# Patient Record
Sex: MALE | Race: WHITE | URBAN - METROPOLITAN AREA
[De-identification: names, ages, dates, MRNs, and addresses within clinical notes are randomized per-mention and may not be internally consistent; named-entity substitution may affect disease eponyms.]

---

## 2017-01-19 PROBLEM — N40.0 BPH (BENIGN PROSTATIC HYPERPLASIA): Status: ACTIVE | Noted: 2017-01-19

## 2017-01-23 PROBLEM — R39.15 URGENCY OF URINATION: Status: ACTIVE | Noted: 2017-01-23

## 2017-01-23 PROBLEM — R35.1 NOCTURIA: Status: ACTIVE | Noted: 2017-01-23

## 2017-01-23 PROBLEM — R33.9 INCOMPLETE BLADDER EMPTYING: Status: ACTIVE | Noted: 2017-01-23

## 2017-04-13 PROBLEM — R39.12 WEAK URINARY STREAM: Status: ACTIVE | Noted: 2017-04-13

## 2020-08-08 PROBLEM — Z80.42 FAMILY HISTORY OF PROSTATE CANCER: Status: ACTIVE | Noted: 2020-08-08

## 2021-03-05 ENCOUNTER — IMMUNIZATION (OUTPATIENT)
Dept: PRIMARY CARE CLINIC | Age: 71
End: 2021-03-05
Payer: COMMERCIAL

## 2021-03-05 PROCEDURE — 0001A COVID-19, PFIZER VACCINE 30MCG/0.3ML DOSE: CPT | Performed by: NURSE PRACTITIONER

## 2021-03-05 PROCEDURE — 91300 COVID-19, PFIZER VACCINE 30MCG/0.3ML DOSE: CPT | Performed by: NURSE PRACTITIONER

## 2021-03-31 ENCOUNTER — IMMUNIZATION (OUTPATIENT)
Dept: PRIMARY CARE CLINIC | Age: 71
End: 2021-03-31
Payer: COMMERCIAL

## 2021-03-31 PROCEDURE — 0002A COVID-19, PFIZER VACCINE 30MCG/0.3ML DOSE: CPT | Performed by: NURSE PRACTITIONER

## 2021-03-31 PROCEDURE — 91300 COVID-19, PFIZER VACCINE 30MCG/0.3ML DOSE: CPT | Performed by: NURSE PRACTITIONER

## 2023-10-30 PROBLEM — L82.1 OTHER SEBORRHEIC KERATOSIS: Status: ACTIVE | Noted: 2023-05-11

## 2023-10-30 PROBLEM — Z85.828 PERSONAL HISTORY OF OTHER MALIGNANT NEOPLASM OF SKIN: Status: ACTIVE | Noted: 2023-05-11

## 2023-10-30 PROBLEM — D48.5 NEOPLASM OF UNCERTAIN BEHAVIOR OF SKIN: Status: ACTIVE | Noted: 2023-05-11

## 2023-10-30 PROBLEM — L91.8 OTHER HYPERTROPHIC DISORDERS OF THE SKIN: Status: ACTIVE | Noted: 2023-05-11

## 2023-10-30 PROBLEM — L60.1 ONYCHOLYSIS: Status: ACTIVE | Noted: 2023-05-11

## 2023-10-30 PROBLEM — D18.01 HEMANGIOMA OF SKIN AND SUBCUTANEOUS TISSUE: Status: ACTIVE | Noted: 2023-05-11

## 2023-10-30 PROBLEM — L57.0 ACTINIC KERATOSIS: Status: ACTIVE | Noted: 2023-05-11

## 2023-10-30 PROBLEM — L81.4 OTHER MELANIN HYPERPIGMENTATION: Status: ACTIVE | Noted: 2023-05-11

## 2023-10-30 PROBLEM — L90.5 SCAR CONDITION AND FIBROSIS OF SKIN: Status: ACTIVE | Noted: 2023-05-11

## 2023-10-30 PROBLEM — L30.8 OTHER SPECIFIED DERMATITIS: Status: ACTIVE | Noted: 2023-05-11

## 2023-10-30 PROBLEM — I83.10 VARICOSE VEINS OF UNSPECIFIED LOWER EXTREMITY WITH INFLAMMATION: Status: ACTIVE | Noted: 2023-05-11

## 2023-10-30 PROBLEM — B35.3 TINEA PEDIS: Status: ACTIVE | Noted: 2023-05-11

## 2023-11-02 ENCOUNTER — OFFICE VISIT (OUTPATIENT)
Dept: DERMATOLOGY | Facility: CLINIC | Age: 73
End: 2023-11-02
Payer: MEDICARE

## 2023-11-02 DIAGNOSIS — L82.1 SEBORRHEIC KERATOSIS: ICD-10-CM

## 2023-11-02 DIAGNOSIS — L81.4 LENTIGO: ICD-10-CM

## 2023-11-02 DIAGNOSIS — D18.01 HEMANGIOMA OF SKIN: ICD-10-CM

## 2023-11-02 DIAGNOSIS — L91.8 SKIN TAG: ICD-10-CM

## 2023-11-02 DIAGNOSIS — Z12.83 ENCOUNTER FOR SCREENING FOR MALIGNANT NEOPLASM OF SKIN: Primary | ICD-10-CM

## 2023-11-02 PROCEDURE — 99213 OFFICE O/P EST LOW 20 MIN: CPT | Performed by: NURSE PRACTITIONER

## 2023-11-02 PROCEDURE — 1159F MED LIST DOCD IN RCRD: CPT | Performed by: NURSE PRACTITIONER

## 2023-11-02 NOTE — PROGRESS NOTES
Subjective     George Starr is a 73 y.o. male who presents for the following: Skin Check (Patient presents in office today for full body skin exam. /PMHX significant for NMSC./Patient endorses the following concerns since their last visit: none./Signs/symptoms - patient denies./Medication changes include: none./Patient denies further complaints. ).     Review of Systems:  No other skin or systemic complaints other than what is documented elsewhere in the note.    The following portions of the chart were reviewed this encounter and updated as appropriate:         Skin Cancer History  No skin cancer on file.      Specialty Problems          Dermatology Problems    Actinic keratosis    Hemangioma of skin and subcutaneous tissue    Neoplasm of uncertain behavior of skin    Onycholysis    Other hypertrophic disorders of the skin    Other melanin hyperpigmentation    Other seborrheic keratosis    Other specified dermatitis    Personal history of other malignant neoplasm of skin    Scar condition and fibrosis of skin    Tinea pedis        Objective   Well appearing patient in no apparent distress; mood and affect are within normal limits.    A full examination was performed including scalp, head, eyes, ears, nose, lips, neck, chest, axillae, abdomen, back, buttocks, bilateral upper extremities, bilateral lower extremities, hands, feet, fingers, toes, fingernails, and toenails. All findings within normal limits unless otherwise noted below.    Assessment/Plan   1. Encounter for screening for malignant neoplasm of skin  Scattered benign lesions     Scattered benign lesions       2. Hemangioma of skin  Violaceous/red papule with maroon lagoons     - A cherry hemangioma is a small macule (small, flat, smooth area) or papule (small, solid bump) formed from an overgrowth of tiny blood vessels in the skin. Cherry hemangiomas are characteristically red or purplish in color. They often first appear in middle adulthood and usually  increase in number with age. Cherry hemangiomas are noncancerous (benign) and are common in adults.  - Lesions are benign, reassured patient.     3. Skin tag  Fleshy, skin-colored sessile and pedunculated papules.     Acrochordon (skin tag)  - The benign nature of the lesion was discussed with patient.   - A skin tag (acrochordon) is a common, possibly inherited condition that manifests as small, flesh-colored growths on a thin stalk. Skin tags are benign lesions that can sometimes become irritated or traumatized.  - Skin tags are very common, and their incidence increases with age. Seen more often in people with growth hormone excess (acromegaly).   - Skin tags are most commonly found on the eyelids, neck, armpits, and groin area. They are flesh-colored growths on a thin stalk, ranging in size from small to large.        4. Seborrheic keratosis  Stuck on verrucous, tan-brown papules and plaques.      Although Seborrheic Keratoses can be troublesome and unsightly, they are entirely benign.  Removal of Seborrheic Keratoses is considered a cosmetic procedure. Removal is typically performed using liquid nitrogen cryotherapy.  Treatment of current lesions does not prevent the development of new Seborrheic Keratoses in the future.    5. Lentigo  Scattered tan macules in sun-exposed areas.    A solar lentigo (plural, solar lentigines), sometimes called an age spot or liver spot, is a brown macule (small, flat, smooth area of skin) caused by chronic sun or artificial ultraviolet (UV) light exposure. There may be just one lentigo or there may be multiple. This type of lentigo is different from lentigo simplex (discussed separately) because it is caused by exposure to UV light. Solar lentigines are benign, but they do indicate excessive sun exposure, a risk factor for the development of skin cancer.  Lesions are benign, no treatment needed.

## 2024-11-06 ENCOUNTER — APPOINTMENT (OUTPATIENT)
Dept: DERMATOLOGY | Facility: CLINIC | Age: 74
End: 2024-11-06
Payer: MEDICARE

## 2024-11-06 DIAGNOSIS — D18.01 HEMANGIOMA OF SKIN: ICD-10-CM

## 2024-11-06 DIAGNOSIS — L82.1 SEBORRHEIC KERATOSIS: ICD-10-CM

## 2024-11-06 DIAGNOSIS — Z12.83 ENCOUNTER FOR SCREENING FOR MALIGNANT NEOPLASM OF SKIN: ICD-10-CM

## 2024-11-06 DIAGNOSIS — L57.0 ACTINIC KERATOSIS: Primary | ICD-10-CM

## 2024-11-06 DIAGNOSIS — L81.4 LENTIGO: ICD-10-CM

## 2024-11-06 PROCEDURE — 1160F RVW MEDS BY RX/DR IN RCRD: CPT | Performed by: NURSE PRACTITIONER

## 2024-11-06 PROCEDURE — 99214 OFFICE O/P EST MOD 30 MIN: CPT | Performed by: NURSE PRACTITIONER

## 2024-11-06 PROCEDURE — G2211 COMPLEX E/M VISIT ADD ON: HCPCS | Performed by: NURSE PRACTITIONER

## 2024-11-06 PROCEDURE — 1159F MED LIST DOCD IN RCRD: CPT | Performed by: NURSE PRACTITIONER

## 2024-11-06 RX ORDER — ALFUZOSIN HYDROCHLORIDE 10 MG/1
1 TABLET, EXTENDED RELEASE ORAL
COMMUNITY
Start: 2024-09-21

## 2024-11-06 RX ORDER — FLUOROURACIL 50 MG/G
CREAM TOPICAL
Qty: 40 G | Refills: 0 | Status: SHIPPED | OUTPATIENT
Start: 2024-11-06

## 2024-11-07 NOTE — PROGRESS NOTES
Subjective     George Starr is a 74 y.o. male who presents for the following: Skin Check (Presents for FBSE. Personal hx BCC (2005), and AK's. Lesion of concern on nose. ).     Review of Systems:  No other skin or systemic complaints other than what is documented elsewhere in the note.    The following portions of the chart were reviewed this encounter and updated as appropriate:  Tobacco  Allergies  Meds  Problems  Med Hx  Surg Hx  Fam Hx       Skin Cancer History  No skin cancer on file.    Specialty Problems          Dermatology Problems    Actinic keratosis    Hemangioma of skin and subcutaneous tissue    Neoplasm of uncertain behavior of skin    Onycholysis    Other hypertrophic disorders of the skin    Other melanin hyperpigmentation    Other seborrheic keratosis    Other specified dermatitis    Personal history of other malignant neoplasm of skin    Scar condition and fibrosis of skin    Tinea pedis     Past Medical History:  George Starr  has a past medical history of Basal cell carcinoma (01/2005).    Past Surgical History:  George Starr  has no past surgical history on file.    Family History:  Patient family history is not on file.    Social History:  George Starr  has no history on file for tobacco use, alcohol use, and drug use.    Allergies:  Patient has no known allergies.    Current Medications / CAM's:    Current Outpatient Medications:     alfuzosin (Uroxatral) 10 mg 24 hr tablet, Take 1 tablet (10 mg) by mouth early in the morning.., Disp: , Rfl:     fluorouracil (Efudex) 5 % cream cream, Apply thin coat to areas of face twice daily for a total of 2 weeks (follow hand out). Wash hands thoroughly after each application., Disp: 40 g, Rfl: 0     Objective   Well appearing patient in no apparent distress; mood and affect are within normal limits.    A focused skin examination was performed. All findings within normal limits unless otherwise noted below.    Assessment/Plan   1. Actinic  keratosis  Head - Anterior (Face)  Scattered erythematous, scaly macules and patches    Actinic keratosis  - Actinic keratosis (AK) is a skin condition caused by sun damage. It causes scaly, rough, or bumpy spots on the skin.  - If left alone, AKs may turn into a skin cancer. People who burn easily or have trouble tanning are at more risk for developing AKs.   - There is no one test for AKs and diagnosis is made by clinical appearance. Treatment options include cryotherapy, therapy with lights, and various creams.     - Common places for AKs to occur are areas exposed to sun (face, arms, hands)          To lower the chance of getting AK, you can:       Stay out of the sun in the middle of the day (from 10 a.m. to 4 p.m.)       Wear sunscreen - An SPF of at least 30 is best. The SPF number is on the sunscreen bottle or tube.       Wear a wide-brimmed hat, long-sleeved shirt, long pants, or long skirt outside. A baseball hat does not give much protection.        Do not use tanning beds.         PLAN  - Due to the amount of AKs, I recommend field treatment with 5-FU. Hand out given.   - The patient expressed understanding, is in agreement with this plan, and wishes to proceed.  Fluorouracil (5-FU)       This medication is used to treat pre-skin cancer skin growths and works by blocking the growth of abnormal skin cells. You will use this drug twice daily for 2 weeks        To apply:       1. Wash and dry the area gently prior to administration of the cream.         2. Use a very small amount rubbed into the area with a fingertip.         3. Wash your hands after application.        4. Do not use on normal skin or eyes or lips.         Expect the area to get red and irritated.  Initially there will be mild to severe stinging or burning in the area.  The area may weep, crust or blister.  Watch for signs of infection, excessive wounding, ulcers, and scarring.  Marked itching maybe a sign of allergy to the drug, please  call the clinic immediately.         Avoid other medications or makeup in the treated area while using 5-fluorouracil and while any wound is healing. Avoid applying around the eye or eyelids. Avoid procedures in the area.  Avoid sun exposure to the area. Use sunscreen SPF 30 or greater while applying the drug. Do not use if pregnant or nursing.  Do not share this medicine with others and keep it away from children and pets.        Gentle moisturizers and Vaseline to soothe the area can be used several times per day.  The treated area will be more sun sensitive. If the lesion does not improve other treatment regimens may be necessary.  Complete healing may take up to 2 months.  After the treatment, you may notice redness in the area or skin color change in the area.   - Risks, benefits, side effects, alternatives and options were discussed with patient and the patient voiced understanding.       fluorouracil (Efudex) 5 % cream cream - Head - Anterior (Face)  Apply thin coat to areas of face twice daily for a total of 2 weeks (follow hand out). Wash hands thoroughly after each application.    2. Encounter for screening for malignant neoplasm of skin  Scattered benign lesions    - Protective measures, such as avoiding skin exposure to sunlight during peak sun hours (10 AM to 3 PM), wearing protective clothing, and applying high-SPF sunscreen, are essential for reducing exposure to harmful ultraviolet (UV) light.  - Monthly self-examination of the skin is helpful to detect new lesions or changes in existing lesions.  - Discussed signs and symptoms of sun-related skin cancers.   - Make sure your moles are not signs of skin cancer (melanoma). Remember the ABCDEs of melanoma lesions:  A - Asymmetry: One half of the lesion does not mirror the other half.  B - Border: The borders are irregular or vague (indistinct).  C - Color: More than one color may be noted within the mole.  D - Diameter: Size greater than 6 mm (roughly  the size of a pencil eraser) may be concerning.  E - Evolving: Notable changes in the lesion over time are suspicious signs for skin cancer.    3. Seborrheic keratosis  Stuck on verrucous, tan-brown papules and plaques.      Although Seborrheic Keratoses can be troublesome and unsightly, they are entirely benign.  Removal of Seborrheic Keratoses is considered a cosmetic procedure. Removal is typically performed using liquid nitrogen cryotherapy.  Treatment of current lesions does not prevent the development of new Seborrheic Keratoses in the future.    4. Hemangioma of skin  Violaceous/red papule with maroon lagoons     - A cherry hemangioma is a small macule (small, flat, smooth area) or papule (small, solid bump) formed from an overgrowth of tiny blood vessels in the skin. Cherry hemangiomas are characteristically red or purplish in color. They often first appear in middle adulthood and usually increase in number with age. Cherry hemangiomas are noncancerous (benign) and are common in adults.  - Lesions are benign, reassured patient.     5. Lentigo  Scattered tan macules in sun-exposed areas.    A solar lentigo (plural, solar lentigines), sometimes called an age spot or liver spot, is a brown macule (small, flat, smooth area of skin) caused by chronic sun or artificial ultraviolet (UV) light exposure. There may be just one lentigo or there may be multiple. This type of lentigo is different from lentigo simplex (discussed separately) because it is caused by exposure to UV light. Solar lentigines are benign, but they do indicate excessive sun exposure, a risk factor for the development of skin cancer.  Lesions are benign, no treatment needed.     Follow up in 6 months for a total body skin exam. Please call me if there are any changes or development of concerning symptoms (lesion/skin condition is changing, bleeding, enlarging, or worsening).

## 2024-11-15 ENCOUNTER — TELEPHONE (OUTPATIENT)
Dept: DERMATOLOGY | Facility: CLINIC | Age: 74
End: 2024-11-15
Payer: MEDICARE

## 2024-11-15 NOTE — TELEPHONE ENCOUNTER
Pt contacted office stating that he has not yet noticed any results from the fluorouracil cream that he started using on Tuesday and wants to know if he is using enough. Advised pt to continue using a thin layer to affected areas twice daily for the two weeks as it may take another day or two to start feeling the effects. Let pt know to contact office after 2 weeks of therapy is complete if he does not feel as if it was effective. Pt verbalized understanding and had no further questions at this time.    Stacie Shea RN

## 2024-11-21 ENCOUNTER — TELEPHONE (OUTPATIENT)
Dept: DERMATOLOGY | Facility: CLINIC | Age: 74
End: 2024-11-21
Payer: MEDICARE

## 2024-11-21 NOTE — TELEPHONE ENCOUNTER
Pt contacted office stating that he has completed the two weeks of 5-FU to his nose and wants to know if he should continue to use the cream for longer. Explained to pt that he needs to let the skin heal and call us if he notices the lesion has no resolved after the skin is no longer red and inflamed. Pt also said that he is unsure where else on his face he is to apply the 5-FU as there are not many noticeable spots and he doesn't want to apply it to a large surface area. Per Susan Mayne, pt can just hold off on continued treatment with the 5-FU since he has treated the large areas of concern and she will recheck his face at his next 6 month skin check. Advised pt to contact office sooner if he notices new and bothersome lesions that appear. Pt verbalized understanding and had no further questions at this time.      Stacie Shea RN

## 2025-05-14 ENCOUNTER — APPOINTMENT (OUTPATIENT)
Dept: DERMATOLOGY | Facility: CLINIC | Age: 75
End: 2025-05-14
Payer: MEDICARE

## 2025-05-14 DIAGNOSIS — L57.0 ACTINIC KERATOSIS: ICD-10-CM

## 2025-05-14 DIAGNOSIS — L81.4 LENTIGO: Primary | ICD-10-CM

## 2025-05-14 DIAGNOSIS — Z12.83 ENCOUNTER FOR SCREENING FOR MALIGNANT NEOPLASM OF SKIN: ICD-10-CM

## 2025-05-14 PROCEDURE — 1159F MED LIST DOCD IN RCRD: CPT | Performed by: NURSE PRACTITIONER

## 2025-05-14 PROCEDURE — 1036F TOBACCO NON-USER: CPT | Performed by: NURSE PRACTITIONER

## 2025-05-14 PROCEDURE — 99213 OFFICE O/P EST LOW 20 MIN: CPT | Performed by: NURSE PRACTITIONER

## 2025-05-14 NOTE — PROGRESS NOTES
Subjective     George Starr is a 74 y.o. male who presents for the following: Skin Check (Established pt presents for waist up skin exam and recheck of areas to face previously treated with 5-FU. ).          Review of Systems:  No other skin or systemic complaints other than what is documented elsewhere in the note.    The following portions of the chart were reviewed this encounter and updated as appropriate:         Skin Cancer History  Biopsy Log Book  No skin cancers from Specimen Tracking.    Additional History      Specialty Problems          Dermatology Problems    Actinic keratosis    Hemangioma of skin and subcutaneous tissue    Neoplasm of uncertain behavior of skin    Onycholysis    Other hypertrophic disorders of the skin    Other melanin hyperpigmentation    Other seborrheic keratosis    Other specified dermatitis    Personal history of other malignant neoplasm of skin    Scar condition and fibrosis of skin    Tinea pedis     Past Medical History:  George Starr  has a past medical history of Basal cell carcinoma (01/2005).    Past Surgical History:  George Starr  has no past surgical history on file.    Family History:  Patient family history is not on file.    Social History:  George Starr  reports that he has never smoked. He has never used smokeless tobacco. No history on file for alcohol use and drug use.    Allergies:  Patient has no known allergies.    Current Medications / CAM's:  Current Medications[1]     Objective   Well appearing patient in no apparent distress; mood and affect are within normal limits.    A focused skin examination was performed. All findings within normal limits unless otherwise noted below.    Assessment/Plan   Skin Exam  1. LENTIGO (3)  Head, Left Arm, Right Arm  Scattered tan macules in sun-exposed areas.  A solar lentigo (plural, solar lentigines), sometimes called an age spot or liver spot, is a brown macule (small, flat, smooth area of skin) caused by chronic  sun or artificial ultraviolet (UV) light exposure. There may be just one lentigo or there may be multiple. This type of lentigo is different from lentigo simplex (discussed separately) because it is caused by exposure to UV light. Solar lentigines are benign, but they do indicate excessive sun exposure, a risk factor for the development of skin cancer.  Lesions are benign, no treatment needed.   2. ENCOUNTER FOR SCREENING FOR MALIGNANT NEOPLASM OF SKIN (2)  Head - Anterior (Face), Neck - Anterior  Scattered benign lesions  - Protective measures, such as avoiding skin exposure to sunlight during peak sun hours (10 AM to 3 PM), wearing protective clothing, and applying high-SPF sunscreen, are essential for reducing exposure to harmful ultraviolet (UV) light.  - Monthly self-examination of the skin is helpful to detect new lesions or changes in existing lesions.  - Discussed signs and symptoms of sun-related skin cancers.   - Make sure your moles are not signs of skin cancer (melanoma). Remember the ABCDEs of melanoma lesions:  A - Asymmetry: One half of the lesion does not mirror the other half.  B - Border: The borders are irregular or vague (indistinct).  C - Color: More than one color may be noted within the mole.  D - Diameter: Size greater than 6 mm (roughly the size of a pencil eraser) may be concerning.  E - Evolving: Notable changes in the lesion over time are suspicious signs for skin cancer.  3. ACTINIC KERATOSIS (2)  Left Orange, Right Temple  Scattered erythematous, scaly macules and patches  Actinic keratosis  - Actinic keratosis (AK) is a skin condition caused by sun damage. It causes scaly, rough, or bumpy spots on the skin.  - If left alone, AKs may turn into a skin cancer. People who burn easily or have trouble tanning are at more risk for developing AKs.   - There is no one test for AKs and diagnosis is made by clinical appearance. Treatment options include cryotherapy, therapy with lights, and  various creams.     - Common places for AKs to occur are areas exposed to sun (face, arms, hands)          To lower the chance of getting AK, you can:       Stay out of the sun in the middle of the day (from 10 a.m. to 4 p.m.)       Wear sunscreen - An SPF of at least 30 is best. The SPF number is on the sunscreen bottle or tube.       Wear a wide-brimmed hat, long-sleeved shirt, long pants, or long skirt outside. A baseball hat does not give much protection.        Do not use tanning beds.         PLAN  - Due to the amount of AKs, I recommend field treatment with 5-FU. Hand out given. Will start in the fall.   - The patient expressed understanding, is in agreement with this plan, and wishes to proceed.  Fluorouracil (5-FU)       This medication is used to treat pre-skin cancer skin growths and works by blocking the growth of abnormal skin cells. You will use this drug twice daily for 2 weeks.        To apply:       1. Wash and dry the area gently prior to administration of the cream.         2. Use a very small amount rubbed into the area with a fingertip.         3. Wash your hands after application.        4. Do not use on normal skin or eyes or lips.         Expect the area to get red and irritated.  Initially there will be mild to severe stinging or burning in the area.  The area may weep, crust or blister.  Watch for signs of infection, excessive wounding, ulcers, and scarring.  Marked itching maybe a sign of allergy to the drug, please call the clinic immediately.         Avoid other medications or makeup in the treated area while using 5-fluorouracil and while any wound is healing. Avoid applying around the eye or eyelids. Avoid procedures in the area.  Avoid sun exposure to the area. Use sunscreen SPF 30 or greater while applying the drug. Do not use if pregnant or nursing.  Do not share this medicine with others and keep it away from children and pets.        Gentle moisturizers and Vaseline to soothe the  area can be used several times per day.  The treated area will be more sun sensitive. If the lesion does not improve other treatment regimens may be necessary.  Complete healing may take up to 2 months.  After the treatment, you may notice redness in the area or skin color change in the area.   - Risks, benefits, side effects, alternatives and options were discussed with patient and the patient voiced understanding.     Related Medications  fluorouracil (Efudex) 5 % cream cream  Apply thin coat to areas of face twice daily for a total of 2 weeks (follow hand out). Wash hands thoroughly after each application.    Follow up in 12 months for a total body skin exam. Please call me if there are any changes or development of concerning symptoms (lesion/skin condition is changing, bleeding, enlarging, or worsening).         [1]   Current Outpatient Medications:     alfuzosin (Uroxatral) 10 mg 24 hr tablet, Take 1 tablet (10 mg) by mouth early in the morning.., Disp: , Rfl:     fluorouracil (Efudex) 5 % cream cream, Apply thin coat to areas of face twice daily for a total of 2 weeks (follow hand out). Wash hands thoroughly after each application., Disp: 40 g, Rfl: 0

## 2025-05-14 NOTE — Clinical Note
Actinic keratosis  - Actinic keratosis (AK) is a skin condition caused by sun damage. It causes scaly, rough, or bumpy spots on the skin.  - If left alone, AKs may turn into a skin cancer. People who burn easily or have trouble tanning are at more risk for developing AKs.   - There is no one test for AKs and diagnosis is made by clinical appearance. Treatment options include cryotherapy, therapy with lights, and various creams.     - Common places for AKs to occur are areas exposed to sun (face, arms, hands)          To lower the chance of getting AK, you can:       Stay out of the sun in the middle of the day (from 10 a.m. to 4 p.m.)       Wear sunscreen - An SPF of at least 30 is best. The SPF number is on the sunscreen bottle or tube.       Wear a wide-brimmed hat, long-sleeved shirt, long pants, or long skirt outside. A baseball hat does not give much protection.        Do not use tanning beds.         PLAN  - Due to the amount of AKs, I recommend field treatment with 5-FU. Hand out given. Will start in the fall.   - The patient expressed understanding, is in agreement with this plan, and wishes to proceed.  Fluorouracil (5-FU)       This medication is used to treat pre-skin cancer skin growths and works by blocking the growth of abnormal skin cells. You will use this drug twice daily for 2 weeks.        To apply:       1. Wash and dry the area gently prior to administration of the cream.         2. Use a very small amount rubbed into the area with a fingertip.         3. Wash your hands after application.        4. Do not use on normal skin or eyes or lips.         Expect the area to get red and irritated.  Initially there will be mild to severe stinging or burning in the area.  The area may weep, crust or blister.  Watch for signs of infection, excessive wounding, ulcers, and scarring.  Marked itching maybe a sign of allergy to the drug, please call the clinic immediately.         Avoid other medications or  makeup in the treated area while using 5-fluorouracil and while any wound is healing. Avoid applying around the eye or eyelids. Avoid procedures in the area.  Avoid sun exposure to the area. Use sunscreen SPF 30 or greater while applying the drug. Do not use if pregnant or nursing.  Do not share this medicine with others and keep it away from children and pets.        Gentle moisturizers and Vaseline to soothe the area can be used several times per day.  The treated area will be more sun sensitive. If the lesion does not improve other treatment regimens may be necessary.  Complete healing may take up to 2 months.  After the treatment, you may notice redness in the area or skin color change in the area.   - Risks, benefits, side effects, alternatives and options were discussed with patient and the patient voiced understanding.

## 2026-05-20 ENCOUNTER — APPOINTMENT (OUTPATIENT)
Dept: DERMATOLOGY | Facility: CLINIC | Age: 76
End: 2026-05-20
Payer: MEDICARE